# Patient Record
Sex: FEMALE | Race: WHITE | NOT HISPANIC OR LATINO | Employment: OTHER | URBAN - METROPOLITAN AREA
[De-identification: names, ages, dates, MRNs, and addresses within clinical notes are randomized per-mention and may not be internally consistent; named-entity substitution may affect disease eponyms.]

---

## 2017-08-01 PROBLEM — I77.1 ARTERIAL INSUFFICIENCY WITH ISCHEMIC ULCER: Status: ACTIVE | Noted: 2017-08-01

## 2017-08-01 PROBLEM — I70.203 ATHEROSCLEROSIS OF ARTERY OF BOTH LOWER EXTREMITIES: Status: ACTIVE | Noted: 2017-08-01

## 2017-08-01 PROBLEM — L98.499 ARTERIAL INSUFFICIENCY WITH ISCHEMIC ULCER: Status: ACTIVE | Noted: 2017-08-01

## 2017-08-21 PROBLEM — L03.115 CELLULITIS OF RIGHT LOWER EXTREMITY: Status: ACTIVE | Noted: 2017-08-21

## 2017-08-22 PROBLEM — E11.59 TYPE 2 DIABETES MELLITUS WITH CIRCULATORY DISORDER: Status: ACTIVE | Noted: 2017-08-22

## 2018-03-23 PROBLEM — R63.8 INCREASED NUTRITIONAL NEEDS: Status: ACTIVE | Noted: 2018-03-23

## 2018-03-27 PROBLEM — R63.8 INCREASED NUTRITIONAL NEEDS: Status: ACTIVE | Noted: 2018-03-27

## 2018-04-11 PROBLEM — I60.9 SUBARACHNOID HEMORRHAGE: Status: ACTIVE | Noted: 2018-04-11

## 2018-04-11 PROBLEM — I60.9 SAH (SUBARACHNOID HEMORRHAGE): Status: ACTIVE | Noted: 2018-04-11

## 2018-04-11 PROBLEM — S06.6X0A SUBARACHNOID HEMORRHAGE FOLLOWING INJURY, NO LOSS OF CONSCIOUSNESS: Status: ACTIVE | Noted: 2018-04-11

## 2018-04-12 ENCOUNTER — TELEPHONE (OUTPATIENT)
Dept: NEUROSURGERY | Facility: CLINIC | Age: 68
End: 2018-04-12

## 2018-04-12 DIAGNOSIS — I60.9 SUBARACHNOID BLEED: Primary | ICD-10-CM

## 2018-04-12 PROBLEM — R63.8 ALTERATION IN NUTRITION: Status: ACTIVE | Noted: 2018-04-12
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
## 2018-04-12 PROBLEM — I47.10 PSVT (PAROXYSMAL SUPRAVENTRICULAR TACHYCARDIA): Status: ACTIVE | Noted: 2018-04-12

## 2018-04-12 NOTE — TELEPHONE ENCOUNTER
Appointments scheduled. Pt is currently IP.    ----- Message from Silvia Farley PA-C sent at 4/12/2018  2:10 PM CDT -----  Hi,    Please schedule this patient to see me in clinic in 2 weeks with a repeat head CT without contrast.     Thanks,    Tiff

## 2018-04-17 ENCOUNTER — OFFICE VISIT (OUTPATIENT)
Dept: CARDIOLOGY | Facility: CLINIC | Age: 68
End: 2018-04-17
Payer: MEDICARE

## 2018-04-17 ENCOUNTER — CLINICAL SUPPORT (OUTPATIENT)
Dept: CARDIOLOGY | Facility: CLINIC | Age: 68
End: 2018-04-17
Attending: INTERNAL MEDICINE
Payer: MEDICARE

## 2018-04-17 VITALS
WEIGHT: 170 LBS | HEIGHT: 63 IN | DIASTOLIC BLOOD PRESSURE: 61 MMHG | HEART RATE: 71 BPM | SYSTOLIC BLOOD PRESSURE: 134 MMHG | BODY MASS INDEX: 30.12 KG/M2

## 2018-04-17 DIAGNOSIS — Z95.810 AICD (AUTOMATIC CARDIOVERTER/DEFIBRILLATOR) PRESENT: ICD-10-CM

## 2018-04-17 DIAGNOSIS — C90.00 MULTIPLE MYELOMA, REMISSION STATUS UNSPECIFIED: ICD-10-CM

## 2018-04-17 DIAGNOSIS — R60.0 PERIPHERAL EDEMA: ICD-10-CM

## 2018-04-17 DIAGNOSIS — I50.23 ACUTE ON CHRONIC SYSTOLIC CONGESTIVE HEART FAILURE: ICD-10-CM

## 2018-04-17 DIAGNOSIS — Z95.810 AICD (AUTOMATIC CARDIOVERTER/DEFIBRILLATOR) PRESENT: Primary | ICD-10-CM

## 2018-04-17 DIAGNOSIS — I63.9 CEREBROVASCULAR ACCIDENT (CVA), UNSPECIFIED MECHANISM: Primary | ICD-10-CM

## 2018-04-17 DIAGNOSIS — N18.6 ESRD ON DIALYSIS: ICD-10-CM

## 2018-04-17 DIAGNOSIS — I47.10 PSVT (PAROXYSMAL SUPRAVENTRICULAR TACHYCARDIA): ICD-10-CM

## 2018-04-17 DIAGNOSIS — I10 ESSENTIAL HYPERTENSION: ICD-10-CM

## 2018-04-17 DIAGNOSIS — Z99.2 ESRD ON DIALYSIS: ICD-10-CM

## 2018-04-17 DIAGNOSIS — I25.10 CORONARY ARTERY DISEASE DUE TO LIPID RICH PLAQUE: ICD-10-CM

## 2018-04-17 DIAGNOSIS — I25.83 CORONARY ARTERY DISEASE DUE TO LIPID RICH PLAQUE: ICD-10-CM

## 2018-04-17 DIAGNOSIS — Z95.5 STENTED CORONARY ARTERY: ICD-10-CM

## 2018-04-17 PROCEDURE — 99999 PR PBB SHADOW E&M-EST. PATIENT-LVL III: CPT | Mod: PBBFAC,,, | Performed by: INTERNAL MEDICINE

## 2018-04-17 PROCEDURE — 99214 OFFICE O/P EST MOD 30 MIN: CPT | Mod: S$PBB,,, | Performed by: INTERNAL MEDICINE

## 2018-04-17 PROCEDURE — 93283 PRGRMG EVAL IMPLANTABLE DFB: CPT | Mod: PBBFAC,PO | Performed by: INTERNAL MEDICINE

## 2018-04-17 PROCEDURE — 99213 OFFICE O/P EST LOW 20 MIN: CPT | Mod: PBBFAC,PO | Performed by: INTERNAL MEDICINE

## 2018-04-17 NOTE — LETTER
April 17, 2018      Óscar Franklin MD  [address] [facility] [address]           [facility]  [address] [facility] [address] [place] [address]  Phone: [phone]          Patient: Katie Gamble   MR Number: [id number]   YOB: 1950   Date of Visit: 4/17/2018       Dear Dr. Óscar Franklin:    Thank you for referring Katie Gamble to me for evaluation. Attached you will find relevant portions of my assessment and plan of care.    If you have questions, please do not hesitate to call me. I look forward to following Katie Gamble along with you.    Sincerely,    Mann Marin Jr., MD    Enclosure  CC:  No Recipients    If you would like to receive this communication electronically, please contact [email or web address] or [phone] to request more information on [employer] Link access.    For providers and/or their staff who would like to refer a patient to [facility], please contact us through our one-stop-shop provider referral line, [facility], at [phone].    If you feel you have received this communication in error or would no longer like to receive these types of communications, please e-mail [email or web address]

## 2018-04-17 NOTE — PROGRESS NOTES
Subjective:    Patient ID:  Katie Gamble is a 67 y.o. female who presents for evaluation of new pt (new pt) and Hospital Follow Up      HPI 66 yo WF with multiple medical issues with previous CABG in 1993, AICD, ICM with EF 30-35%, ESRD on dialysis, PVD with non-healing ulcers, and PSVT. She is establishing care with [facility]. No acute issues today. Has surgery on legs by vascular surgery scheduled in the future.Denies CP. Does have occasional palpitation.  Review of Systems   Constitution: Positive for malaise/fatigue. Negative for decreased appetite, fever, weakness, weight gain and weight loss.   HENT: Negative for hearing loss and nosebleeds.    Eyes: Negative for visual disturbance.   Cardiovascular: Positive for claudication, dyspnea on exertion and palpitations. Negative for chest pain, cyanosis, irregular heartbeat, leg swelling, near-syncope, orthopnea, paroxysmal nocturnal dyspnea and syncope.   Respiratory: Positive for shortness of breath. Negative for cough, hemoptysis, sleep disturbances due to breathing, snoring and wheezing.    Endocrine: Negative for cold intolerance, heat intolerance, polydipsia and polyuria.   Hematologic/Lymphatic: Negative for adenopathy and bleeding problem. Does not bruise/bleed easily.   Skin: Positive for poor wound healing. Negative for color change, itching, rash and suspicious lesions.   Musculoskeletal: Negative for arthritis, back pain, falls, joint pain, joint swelling, muscle cramps, muscle weakness and myalgias.   Gastrointestinal: Negative for bloating, abdominal pain, change in bowel habit, constipation, flatus, heartburn, hematemesis, hematochezia, hemorrhoids, jaundice, melena, nausea and vomiting.        Hx of rectal bleeding   Genitourinary: Negative for bladder incontinence, decreased libido, frequency, hematuria, hesitancy and urgency.   Neurological: Negative for brief paralysis, difficulty with concentration, excessive daytime sleepiness, dizziness,  "focal weakness, headaches, light-headedness, loss of balance, numbness and vertigo.   Psychiatric/Behavioral: Negative for altered mental status, depression and memory loss. The patient does not have insomnia and is not nervous/anxious.    Allergic/Immunologic: Negative for environmental allergies, hives and persistent infections.        Objective:    Physical Exam   Constitutional: She is oriented to person, place, and time. She appears well-developed and well-nourished.   /61 (BP Location: Right arm, Patient Position: Sitting, BP Method: Medium (Automatic))   Pulse 71   Ht 5' 3" (1.6 m)   Wt 77.1 kg (170 lb)   LMP  (LMP Unknown) Comment: Hysterectomy  BMI 30.11 kg/m²      HENT:   Head: Normocephalic and atraumatic.   Right Ear: External ear normal.   Left Ear: External ear normal.   Nose: Nose normal.   Mouth/Throat: Oropharynx is clear and moist.   Eyes: Conjunctivae, EOM and lids are normal. Pupils are equal, round, and reactive to light. Right eye exhibits no discharge. Left eye exhibits no discharge. Right conjunctiva has no hemorrhage. No scleral icterus.   Neck: Normal range of motion. Neck supple. No JVD present. No tracheal deviation present. No thyromegaly present.   Cardiovascular: Normal rate, regular rhythm, normal heart sounds and intact distal pulses.  Exam reveals decreased pulses. Exam reveals no gallop and no friction rub.    No murmur heard.  Pulses:       Dorsalis pedis pulses are 0 on the right side, and 0 on the left side.        Posterior tibial pulses are 0 on the right side, and 0 on the left side.   Legs bandaged from wound care  Shunt right arm   Pulmonary/Chest: Effort normal and breath sounds normal. No respiratory distress. She has no wheezes. She has no rales. She exhibits no tenderness. Breasts are symmetrical.   AICD   Sternal scar   Abdominal: Soft. Bowel sounds are normal. She exhibits no distension and no mass. There is no hepatosplenomegaly or hepatomegaly. There is " no tenderness. There is no rebound and no guarding.   Musculoskeletal: Normal range of motion. She exhibits no edema or tenderness.   Lymphadenopathy:     She has no cervical adenopathy.   Neurological: She is alert and oriented to person, place, and time. She displays normal reflexes. No cranial nerve deficit. Coordination normal.   Skin: Skin is warm and dry. No rash noted. No erythema. No pallor.   Psychiatric: She has a normal mood and affect. Her behavior is normal. Judgment and thought content normal.   Nursing note and vitals reviewed.        Assessment:       1. Cerebrovascular accident (CVA), unspecified mechanism    2. Acute on chronic systolic congestive heart failure    3. Coronary artery disease due to lipid rich plaque    4. Essential hypertension    5. Stented coronary artery    6. ESRD on dialysis    7. Multiple myeloma, remission status unspecified    8. Peripheral edema    9. PSVT (paroxysmal supraventricular tachycardia)         Plan:     Would continue current meds    Establish in pacer clinic    No orders of the defined types were placed in this encounter.    Follow-up in about 6 months (around 10/17/2018).

## 2018-04-30 ENCOUNTER — TELEPHONE (OUTPATIENT)
Dept: NEUROSURGERY | Facility: CLINIC | Age: 68
End: 2018-04-30

## 2018-04-30 NOTE — TELEPHONE ENCOUNTER
Spoke with Zoe and informed that Dr. Sandhu wants pt to be seen in clinic and have imaging completed prior to making decisions regarding any anticoagulants.    ----- Message from Simi Li sent at 4/30/2018 11:17 AM CDT -----  Contact: Zoe from Dr Markus Fernando's office   States she's calling rg rtn nurses call rg a clearance request and can be reached at [phone]//thanks/dbw

## 2018-04-30 NOTE — TELEPHONE ENCOUNTER
Left voice message for pt to call and schedule CT. Will call with results. Once scheduled, call Zoe back to inform that has been scheduled at [phone].

## 2018-04-30 NOTE — TELEPHONE ENCOUNTER
----- Message from Pam Ashby sent at 4/30/2018  2:33 PM CDT -----  Contact: Zoe Gamble/ Dr. Mac  Caller returning nurse call], request call back  [phone]

## 2018-05-03 ENCOUNTER — HOSPITAL ENCOUNTER (OUTPATIENT)
Dept: RADIOLOGY | Facility: HOSPITAL | Age: 68
Discharge: HOME | End: 2018-05-03
Attending: PHYSICIAN ASSISTANT
Payer: MEDICARE

## 2018-05-03 DIAGNOSIS — I60.9 SUBARACHNOID BLEED: ICD-10-CM

## 2018-05-03 PROCEDURE — 70450 CT HEAD/BRAIN W/O DYE: CPT | Mod: TC,PO

## 2018-05-03 PROCEDURE — 70450 CT HEAD/BRAIN W/O DYE: CPT | Mod: 26,,, | Performed by: RADIOLOGY

## 2018-05-07 ENCOUNTER — TELEPHONE (OUTPATIENT)
Dept: NEUROSURGERY | Facility: CLINIC | Age: 68
End: 2018-05-07

## 2018-05-07 NOTE — TELEPHONE ENCOUNTER
Please let patient know that the blood that was present on the brain following her prior fall has now resolved.

## 2018-05-07 NOTE — TELEPHONE ENCOUNTER
Pt is calling for results of CT scan from 5/3/18. Please advise.     ----- Message from Sujatha Baezite sent at 5/4/2018  4:32 PM CDT -----  Contact: Pt  Pt called and stated she needed to speak to the nurse. She stated that she needs to get her test results. She can be reached at [phone].    Thanks,  Tf

## 2018-05-07 NOTE — TELEPHONE ENCOUNTER
Spoke with Zoe. Awaiting information from Dr. Sandhu.     ----- Message from Haylee Franco sent at 5/7/2018 10:17 AM CDT -----  Contact: Zoe/Dr. Julio Fernando Office   Zoe needs a call back at [phone], Regards to CT results.    Thanks  td

## 2018-05-08 ENCOUNTER — TELEPHONE (OUTPATIENT)
Dept: NEUROSURGERY | Facility: CLINIC | Age: 68
End: 2018-05-08

## 2018-05-08 NOTE — TELEPHONE ENCOUNTER
Spoke with Zoe with Dr. Rubi's office and informed that Dr. Sandhu is looking over clearance letter. Once completed, it will be faxed over and I will call to inform Zoe.     ----- Message from Griselda Oliva sent at 5/8/2018 11:41 AM CDT -----  Contact: Zoe Rubi   Stated she's calling about the pt results and can be reached at [phone] Thanks

## 2018-09-25 PROBLEM — S42.302A FRACTURE OF HUMERAL SHAFT, LEFT, CLOSED: Status: ACTIVE | Noted: 2018-09-25

## 2018-09-26 PROBLEM — G93.40 ACUTE ENCEPHALOPATHY: Status: ACTIVE | Noted: 2018-09-26

## 2018-09-27 PROBLEM — R40.4 ALTERED CONSCIOUSNESS: Status: ACTIVE | Noted: 2018-09-27

## 2018-09-28 PROBLEM — G93.40 ACUTE ENCEPHALOPATHY: Status: RESOLVED | Noted: 2018-09-26 | Resolved: 2018-09-28

## 2018-09-28 PROBLEM — R40.4 ALTERED CONSCIOUSNESS: Status: RESOLVED | Noted: 2018-09-27 | Resolved: 2018-09-28

## 2018-09-28 PROBLEM — R78.81 BACTEREMIA: Status: ACTIVE | Noted: 2018-09-28

## 2018-10-18 DIAGNOSIS — Z95.810 AICD (AUTOMATIC CARDIOVERTER/DEFIBRILLATOR) PRESENT: Primary | ICD-10-CM

## 2018-10-18 DIAGNOSIS — I47.10 PSVT (PAROXYSMAL SUPRAVENTRICULAR TACHYCARDIA): ICD-10-CM

## 2018-11-13 ENCOUNTER — DOCUMENTATION ONLY (OUTPATIENT)
Dept: CARDIOLOGY | Facility: CLINIC | Age: 68
End: 2018-11-13

## 2018-11-13 NOTE — PROGRESS NOTES
Nurse from [facility] called and cancelled pts appt for device check and Tania.  She states pt will no longer be seeing Dr Marin or seen in pacemaker clinic.  Pt will be seeing Cardiologist, Dr Scott.  Advised her that when that the device clinic there needs to put in request for patient home monitoring and we will no longer be monitoring pt in our clinic.

## 2018-12-26 PROBLEM — R07.81 RIB PAIN ON LEFT SIDE: Status: ACTIVE | Noted: 2018-12-26

## 2018-12-26 PROBLEM — M25.552 LEFT HIP PAIN: Status: ACTIVE | Noted: 2018-12-26

## 2018-12-31 PROBLEM — S42.302K CLOSED FRACTURE OF SHAFT OF LEFT HUMERUS WITH NONUNION: Status: ACTIVE | Noted: 2018-09-25

## 2019-01-08 PROBLEM — E86.0 DEHYDRATION: Status: ACTIVE | Noted: 2019-01-08

## 2019-01-08 PROBLEM — R11.0 NAUSEA: Status: ACTIVE | Noted: 2019-01-08

## 2019-01-14 PROBLEM — L89.152 PRESSURE INJURY OF SACRAL REGION, STAGE 2: Status: ACTIVE | Noted: 2019-01-14

## 2019-01-14 PROBLEM — D62 ACUTE BLOOD LOSS ANEMIA: Status: ACTIVE | Noted: 2019-01-14

## 2019-01-14 PROBLEM — I95.1 ORTHOSTATIC HYPOTENSION: Status: ACTIVE | Noted: 2019-01-14
